# Patient Record
Sex: FEMALE | Race: BLACK OR AFRICAN AMERICAN | NOT HISPANIC OR LATINO | Employment: OTHER | URBAN - METROPOLITAN AREA
[De-identification: names, ages, dates, MRNs, and addresses within clinical notes are randomized per-mention and may not be internally consistent; named-entity substitution may affect disease eponyms.]

---

## 2023-05-18 ENCOUNTER — TELEPHONE (OUTPATIENT)
Dept: NEUROLOGY | Facility: CLINIC | Age: 48
End: 2023-05-18
Payer: MEDICARE

## 2023-05-18 NOTE — TELEPHONE ENCOUNTER
Tried to call patient to set up NP appointment with Dr. Figueroa from referral. Non working number. Tried to call relative no answer and no voice mail set up